# Patient Record
Sex: MALE | Race: WHITE | NOT HISPANIC OR LATINO | Employment: STUDENT | URBAN - METROPOLITAN AREA
[De-identification: names, ages, dates, MRNs, and addresses within clinical notes are randomized per-mention and may not be internally consistent; named-entity substitution may affect disease eponyms.]

---
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
## 2023-12-06 ENCOUNTER — HOSPITAL ENCOUNTER (OUTPATIENT)
Facility: HOSPITAL | Age: 18
Discharge: HOME | End: 2023-12-06
Attending: EMERGENCY MEDICINE | Admitting: EMERGENCY MEDICINE
Payer: MEDICAID

## 2023-12-06 VITALS
RESPIRATION RATE: 16 BRPM | BODY MASS INDEX: 28.14 KG/M2 | TEMPERATURE: 98.2 F | DIASTOLIC BLOOD PRESSURE: 88 MMHG | SYSTOLIC BLOOD PRESSURE: 139 MMHG | HEART RATE: 100 BPM | WEIGHT: 212.3 LBS | HEIGHT: 73 IN | OXYGEN SATURATION: 98 %

## 2023-12-06 DIAGNOSIS — J02.8 VIRAL SORE THROAT: ICD-10-CM

## 2023-12-06 DIAGNOSIS — B97.89 VIRAL SORE THROAT: ICD-10-CM

## 2023-12-06 DIAGNOSIS — J06.9 VIRAL UPPER RESPIRATORY ILLNESS: Primary | ICD-10-CM

## 2023-12-06 LAB
FLUAV SUBTYP SPEC NAA+PROBE: NOT DETECTED
FLUBV RNA ISLT QL NAA+PROBE: NOT DETECTED
SARS-COV-2 RNA RESP QL NAA+PROBE: NOT DETECTED
STREP A PCR: NOT DETECTED

## 2023-12-06 PROCEDURE — 87636 SARSCOV2 & INF A&B AMP PRB: CPT | Performed by: EMERGENCY MEDICINE

## 2023-12-06 PROCEDURE — 87651 STREP A DNA AMP PROBE: CPT | Performed by: EMERGENCY MEDICINE

## 2023-12-06 PROCEDURE — G0463 HOSPITAL OUTPT CLINIC VISIT: HCPCS | Performed by: NURSE PRACTITIONER

## 2023-12-06 NOTE — DISCHARGE INSTRUCTIONS
Follow-up with primary care for further evaluation and treatment.    Tylenol/Motrin as needed for pain/fevers    You can take Claritin or Zyrtec or Allegra from over-the-counter take as per 's directions     We can also get Flonase over-the-counter and use per 's directions.  This make sure when you spray the Flonase in your nose, you point toward your ear not your nose.     Return for any new or worsening symptoms.

## 2023-12-06 NOTE — Clinical Note
[place] [id number] [place] [address] [place] [address]  Phone: [phone]    Collins Zavaleta was seen and treated in our emergency department on 12/6/2023.  He may return to work on 12/07/2023.         Thank you for choosing [facility].    Celestino Campos MD

## 2023-12-06 NOTE — FSED PROVIDER NOTE
Subjective   History of Present Illness  The patient is a 18-year-old male who presents to the ER for sore throat that started yesterday.  Patient reports he woke up during the night thinks he had a fever but is not sure.  Patient also reports he has had cough and congestion.  Patient reports he has not taken anything over-the-counter.    History provided by:  Patient   used: No        Review of Systems   HENT:  Positive for congestion and sore throat.    Respiratory:  Positive for cough.        No past medical history on file.    No Known Allergies    No past surgical history on file.    No family history on file.    Social History     Socioeconomic History    Marital status: Single   Tobacco Use    Smoking status: Never    Smokeless tobacco: Never   Substance and Sexual Activity    Alcohol use: Never    Drug use: Never           Objective   Physical Exam  Vitals and nursing note reviewed.   Constitutional:       Appearance: Normal appearance. He is well-developed.   HENT:      Head: Normocephalic.      Right Ear: Tympanic membrane and ear canal normal.      Left Ear: Tympanic membrane and ear canal normal.      Nose: Nose normal.      Mouth/Throat:      Lips: Pink.      Mouth: Mucous membranes are moist.      Pharynx: Oropharynx is clear. Uvula midline.   Eyes:      Extraocular Movements: Extraocular movements intact.      Conjunctiva/sclera: Conjunctivae normal.      Pupils: Pupils are equal, round, and reactive to light.   Cardiovascular:      Rate and Rhythm: Normal rate and regular rhythm.      Pulses: Normal pulses.      Heart sounds: Normal heart sounds.   Pulmonary:      Effort: Pulmonary effort is normal.      Breath sounds: Normal breath sounds and air entry.   Abdominal:      General: Bowel sounds are normal.      Palpations: Abdomen is soft.   Musculoskeletal:         General: Normal range of motion.      Cervical back: Normal range of motion and neck supple.   Skin:     General:  Skin is warm and dry.   Neurological:      General: No focal deficit present.      Mental Status: He is alert and oriented to person, place, and time.   Psychiatric:         Mood and Affect: Mood normal.         Behavior: Behavior normal. Behavior is cooperative.         Procedures           ED Course  ED Course as of 12/06/23 1558   Wed Dec 06, 2023   1545 COVID19: Not Detected [DS]   1545 Influenza A PCR: Not Detected [DS]   1545 Influenza B PCR: Not Detected [DS]   1545 STREP A PCR: Not Detected [DS]      ED Course User Index  [DS] Domitila Cota APRN                                           Medical Decision Making  This patient presents with symptoms suspicious for likely viral upper respiratory infection. Based on history and physical doubt sinusitis. COVID/flu/strep is all negative at this time.  Do not suspect underlying cardiopulmonary process. I considered, but think unlikely, dangerous causes of this patient's symptoms to include ACS, CHF or COPD exacerbations, pneumonia, pneumothorax. Patient is nontoxic appearing and not in need of emergent medical intervention. Patient advised to try Claritin/Allegra/Zyrtec over-the-counter, Tylenol/Motrin, patient advised to drink plenty of fluids.    Problems Addressed:  Viral sore throat: acute illness or injury  Viral upper respiratory illness: acute illness or injury    Amount and/or Complexity of Data Reviewed  Labs:  Decision-making details documented in ED Course.    Risk  OTC drugs.        Final diagnoses:   Viral upper respiratory illness   Viral sore throat       ED Disposition  ED Disposition       ED Disposition   Discharge    Condition   Stable    Comment   --               Christie Noel MD  [address] [facility] [id number] [phone]    Schedule an appointment as soon as possible for a visit   As needed, If symptoms worsen         Medication List      No changes were made to your prescriptions during this visit.